# Patient Record
Sex: FEMALE | Race: AMERICAN INDIAN OR ALASKA NATIVE
[De-identification: names, ages, dates, MRNs, and addresses within clinical notes are randomized per-mention and may not be internally consistent; named-entity substitution may affect disease eponyms.]

---

## 2017-10-03 ENCOUNTER — HOSPITAL ENCOUNTER (OUTPATIENT)
Dept: HOSPITAL 5 - MAMMO | Age: 47
Discharge: HOME | End: 2017-10-03
Attending: INTERNAL MEDICINE
Payer: COMMERCIAL

## 2017-10-03 DIAGNOSIS — Z12.31: Primary | ICD-10-CM

## 2017-10-03 DIAGNOSIS — Z90.11: ICD-10-CM

## 2017-10-03 PROCEDURE — G0202 SCR MAMMO BI INCL CAD: HCPCS

## 2017-10-03 PROCEDURE — 77067 SCR MAMMO BI INCL CAD: CPT

## 2017-10-03 NOTE — MAMMOGRAPHY REPORT
Implant mammogram:



Routine imaging over the whole breast as well as displacement images 

are compared to a comparable exam and September 2016. Submuscular 

implants are intact bilaterally. The patient had a right mastectomy for 

cancer with clips placed superiorly. The left breast pattern is 

unremarkable. These findings are unchanged compared to her prior study. 

No evidence of recurrent disease.



CAD used.



Impression:



Stable breast pattern.



Recommendation:



Annual mammogram followup.



BI-RADS CATEGORY:  2 = Benign



ACR BI-RADS MAMMOGRAPHIC CODES:

0 = Needs additional imaging evaluation; 1 = Negative; 2 = Benign; 3 = 

Probably benign; 4 = Suspicious; 5 = Malignant; 6 = Known biopsy-proven 

malignancy



COMMENT:

      1.   Dense breast tissue, i.e., adenosis, fibrocystic 

            changes, etc., may obscure an underlying neoplasm.

      2.   Approximately 10% of cancers are not detected with

            mammography.

      3.   A negative mammography report should not delay biopsy 

            if a clinically suspicious mass is present.

## 2018-10-04 ENCOUNTER — HOSPITAL ENCOUNTER (OUTPATIENT)
Dept: HOSPITAL 5 - MAMMO | Age: 48
Discharge: HOME | End: 2018-10-04
Attending: INTERNAL MEDICINE
Payer: COMMERCIAL

## 2018-10-04 DIAGNOSIS — Z12.31: Primary | ICD-10-CM

## 2018-10-04 PROCEDURE — 77067 SCR MAMMO BI INCL CAD: CPT

## 2018-10-04 NOTE — MAMMOGRAPHY REPORT
BILATERAL DIGITAL AUGMENTED SCREENING MAMMOGRAM with CAD: 10/04/18 

11:36:00



CLINICAL: Routine screening.  Status post right mastectomy with 

TRAM/implant reconstruction and status post left breast augmentation.



COMPARISON:10/03/17 and 09/29/16



FINDINGS: Routine views of the right breast and screening views of the 

left breast with and without implant displacement performed.  The 

reconstructed right breast is negative.  A left upper asymmetry on the 

implant displaced MLO view requires additional imaging.  No 

architectural distortion or suspicious calcifications. Intact 

subpectoral implants.



IMPRESSION: Left asymmetry requiring additional imaging.



BI-RADS CATEGORY:  0 -- Needs Additional Imaging



RECOMMENDATION: Recall for left implant displaced spot compression MLO 

view and left breast ultrasound if needed.



ACR BI-RADS MAMMOGRAPHIC CODES:

0 = Needs additional imaging evaluation; 1 = Negative; 2 = Benign; 3 = 

Probably benign; 4 = Suspicious; 5 = Malignant; 6 = Known biopsy-proven 

malignancy



COMMENT:

      1.   Dense breast tissue, i.e., adenosis, fibrocystic 

            changes, etc., may obscure an underlying neoplasm.

      2.   Approximately 10% of cancers are not detected with

            mammography.

      3.   A negative mammography report should not delay biopsy 

            if a clinically suspicious mass is present.



COMMENT:

Patient follow-up letters are generated via our [facility] application.